# Patient Record
Sex: MALE | Race: WHITE | NOT HISPANIC OR LATINO | Employment: FULL TIME | URBAN - METROPOLITAN AREA
[De-identification: names, ages, dates, MRNs, and addresses within clinical notes are randomized per-mention and may not be internally consistent; named-entity substitution may affect disease eponyms.]

---

## 2019-07-31 ENCOUNTER — HOSPITAL ENCOUNTER (EMERGENCY)
Facility: HOSPITAL | Age: 19
Discharge: HOME | End: 2019-07-31
Attending: EMERGENCY MEDICINE | Admitting: EMERGENCY MEDICINE
Payer: COMMERCIAL

## 2019-07-31 ENCOUNTER — APPOINTMENT (EMERGENCY)
Dept: RADIOLOGY | Facility: HOSPITAL | Age: 19
End: 2019-07-31
Payer: COMMERCIAL

## 2019-07-31 VITALS
DIASTOLIC BLOOD PRESSURE: 81 MMHG | BODY MASS INDEX: 19.26 KG/M2 | HEIGHT: 69 IN | OXYGEN SATURATION: 100 % | WEIGHT: 130 LBS | SYSTOLIC BLOOD PRESSURE: 153 MMHG | TEMPERATURE: 97.7 F | HEART RATE: 81 BPM | RESPIRATION RATE: 18 BRPM

## 2019-07-31 DIAGNOSIS — R11.10 VOMITING: ICD-10-CM

## 2019-07-31 DIAGNOSIS — K92.0 HEMATEMESIS: Primary | ICD-10-CM

## 2019-07-31 LAB
ALBUMIN SERPL BCP-MCNC: 4.1 G/DL (ref 3.5–5)
ALP SERPL-CCNC: 95 U/L (ref 46–484)
ALT SERPL W P-5'-P-CCNC: 8 U/L (ref 12–78)
ANION GAP SERPL CALCULATED.3IONS-SCNC: 6 MMOL/L (ref 4–13)
APTT PPP: 32 SECONDS (ref 23–37)
AST SERPL W P-5'-P-CCNC: 17 U/L (ref 5–45)
BASOPHILS # BLD AUTO: 0.03 THOUSANDS/ΜL (ref 0–0.1)
BASOPHILS NFR BLD AUTO: 1 % (ref 0–1)
BILIRUB SERPL-MCNC: 0.6 MG/DL (ref 0.2–1)
BUN SERPL-MCNC: 12 MG/DL (ref 5–25)
CALCIUM SERPL-MCNC: 8.8 MG/DL (ref 8.3–10.1)
CHLORIDE SERPL-SCNC: 103 MMOL/L (ref 100–108)
CO2 SERPL-SCNC: 32 MMOL/L (ref 21–32)
CREAT SERPL-MCNC: 1.08 MG/DL (ref 0.6–1.3)
EOSINOPHIL # BLD AUTO: 0.34 THOUSAND/ΜL (ref 0–0.61)
EOSINOPHIL NFR BLD AUTO: 5 % (ref 0–6)
ERYTHROCYTE [DISTWIDTH] IN BLOOD BY AUTOMATED COUNT: 12.3 % (ref 11.6–15.1)
GFR SERPL CREATININE-BSD FRML MDRD: 99 ML/MIN/1.73SQ M
GLUCOSE SERPL-MCNC: 103 MG/DL (ref 65–140)
HCT VFR BLD AUTO: 49.7 % (ref 36.5–49.3)
HGB BLD-MCNC: 17.4 G/DL (ref 12–17)
IMM GRANULOCYTES # BLD AUTO: 0.01 THOUSAND/UL (ref 0–0.2)
IMM GRANULOCYTES NFR BLD AUTO: 0 % (ref 0–2)
INR PPP: 1.08 (ref 0.84–1.19)
LIPASE SERPL-CCNC: 152 U/L (ref 73–393)
LYMPHOCYTES # BLD AUTO: 2.38 THOUSANDS/ΜL (ref 0.6–4.47)
LYMPHOCYTES NFR BLD AUTO: 38 % (ref 14–44)
MCH RBC QN AUTO: 33.4 PG (ref 26.8–34.3)
MCHC RBC AUTO-ENTMCNC: 35 G/DL (ref 31.4–37.4)
MCV RBC AUTO: 95 FL (ref 82–98)
MONOCYTES # BLD AUTO: 0.41 THOUSAND/ΜL (ref 0.17–1.22)
MONOCYTES NFR BLD AUTO: 7 % (ref 4–12)
NEUTROPHILS # BLD AUTO: 3.08 THOUSANDS/ΜL (ref 1.85–7.62)
NEUTS SEG NFR BLD AUTO: 49 % (ref 43–75)
NRBC BLD AUTO-RTO: 0 /100 WBCS
PLATELET # BLD AUTO: 289 THOUSANDS/UL (ref 149–390)
PMV BLD AUTO: 9.2 FL (ref 8.9–12.7)
POTASSIUM SERPL-SCNC: 4 MMOL/L (ref 3.5–5.3)
PROT SERPL-MCNC: 7.6 G/DL (ref 6.4–8.2)
PROTHROMBIN TIME: 13.7 SECONDS (ref 11.6–14.5)
RBC # BLD AUTO: 5.21 MILLION/UL (ref 3.88–5.62)
SODIUM SERPL-SCNC: 141 MMOL/L (ref 136–145)
WBC # BLD AUTO: 6.25 THOUSAND/UL (ref 4.31–10.16)

## 2019-07-31 PROCEDURE — 96361 HYDRATE IV INFUSION ADD-ON: CPT

## 2019-07-31 PROCEDURE — 83690 ASSAY OF LIPASE: CPT | Performed by: EMERGENCY MEDICINE

## 2019-07-31 PROCEDURE — 36415 COLL VENOUS BLD VENIPUNCTURE: CPT | Performed by: EMERGENCY MEDICINE

## 2019-07-31 PROCEDURE — 71046 X-RAY EXAM CHEST 2 VIEWS: CPT

## 2019-07-31 PROCEDURE — 85730 THROMBOPLASTIN TIME PARTIAL: CPT | Performed by: EMERGENCY MEDICINE

## 2019-07-31 PROCEDURE — 96375 TX/PRO/DX INJ NEW DRUG ADDON: CPT

## 2019-07-31 PROCEDURE — 80053 COMPREHEN METABOLIC PANEL: CPT | Performed by: EMERGENCY MEDICINE

## 2019-07-31 PROCEDURE — 85610 PROTHROMBIN TIME: CPT | Performed by: EMERGENCY MEDICINE

## 2019-07-31 PROCEDURE — 96365 THER/PROPH/DIAG IV INF INIT: CPT

## 2019-07-31 PROCEDURE — 99284 EMERGENCY DEPT VISIT MOD MDM: CPT | Performed by: EMERGENCY MEDICINE

## 2019-07-31 PROCEDURE — 99285 EMERGENCY DEPT VISIT HI MDM: CPT

## 2019-07-31 PROCEDURE — C9113 INJ PANTOPRAZOLE SODIUM, VIA: HCPCS | Performed by: EMERGENCY MEDICINE

## 2019-07-31 PROCEDURE — 85025 COMPLETE CBC W/AUTO DIFF WBC: CPT | Performed by: EMERGENCY MEDICINE

## 2019-07-31 RX ORDER — ESOMEPRAZOLE MAGNESIUM 40 MG/1
40 CAPSULE, DELAYED RELEASE ORAL DAILY
Qty: 14 CAPSULE | Refills: 0 | Status: SHIPPED | OUTPATIENT
Start: 2019-07-31 | End: 2019-08-14

## 2019-07-31 RX ORDER — ONDANSETRON 2 MG/ML
4 INJECTION INTRAMUSCULAR; INTRAVENOUS ONCE
Status: COMPLETED | OUTPATIENT
Start: 2019-07-31 | End: 2019-07-31

## 2019-07-31 RX ORDER — ONDANSETRON 4 MG/1
4 TABLET, ORALLY DISINTEGRATING ORAL EVERY 8 HOURS PRN
Qty: 12 TABLET | Refills: 0 | Status: SHIPPED | OUTPATIENT
Start: 2019-07-31 | End: 2019-08-04

## 2019-07-31 RX ADMIN — ONDANSETRON 4 MG: 2 INJECTION INTRAMUSCULAR; INTRAVENOUS at 07:10

## 2019-07-31 RX ADMIN — SODIUM CHLORIDE 80 MG: 9 INJECTION, SOLUTION INTRAVENOUS at 07:12

## 2019-07-31 RX ADMIN — SODIUM CHLORIDE 1000 ML: 0.9 INJECTION, SOLUTION INTRAVENOUS at 07:45

## 2019-07-31 NOTE — ED PROVIDER NOTES
History  Chief Complaint   Patient presents with    Vomiting Blood     Pt c/o of vomiting blood this morning, Pt states he vomited 3 times yesterday  Pt states he has upper abdominal pain  22year old male presents for evaluation after vomiting approximately 2 tablespoons of bright red blood 30 minutes prior to arrival  Patient began having moderate epigastric pain this morning as well which is nonradiating  Patient states that he had 3 episodes of emesis yesterday morning  He reports cough for the past week with lateral lower chest wall pain that worsens with movement  Patient reports drinking a single beer last night  No history of hematemesis in the past        History provided by:  Patient  Vomiting   Severity:  Mild  Duration: 30 minutes  Timing:  Sporadic  Number of daily episodes:  1  Quality:  Bright red blood  Progression:  Partially resolved  Chronicity:  New  Recent urination:  Normal  Relieved by:  None tried  Worsened by:  Nothing  Ineffective treatments:  None tried  Associated symptoms: abdominal pain and cough    Associated symptoms: no diarrhea, no fever, no headaches, no myalgias and no sore throat    Abdominal pain:     Location:  Epigastric    Quality: aching and burning      Severity:  Mild    Onset quality:  Sudden    Duration: 30 minutes  Timing:  Constant    Progression:  Partially resolved    Chronicity:  New  Abdominal Pain   Associated symptoms: chest pain (lower bilateral chest wall), cough, nausea and vomiting    Associated symptoms: no constipation, no diarrhea, no dysuria, no fatigue, no fever, no hematuria, no shortness of breath and no sore throat        None       Past Medical History:   Diagnosis Date    Hydronephrosis of left kidney        History reviewed  No pertinent surgical history  History reviewed  No pertinent family history  I have reviewed and agree with the history as documented      Social History     Tobacco Use    Smoking status: Current Every Day Smoker    Smokeless tobacco: Never Used   Substance Use Topics    Alcohol use: Yes    Drug use: Yes     Types: Marijuana        Review of Systems   Constitutional: Negative for appetite change, diaphoresis, fatigue and fever  HENT: Negative for congestion, rhinorrhea and sore throat  Respiratory: Positive for cough  Negative for chest tightness and shortness of breath  Cardiovascular: Positive for chest pain (lower bilateral chest wall)  Negative for palpitations and leg swelling  Gastrointestinal: Positive for abdominal pain, nausea and vomiting  Negative for constipation and diarrhea  Genitourinary: Negative for difficulty urinating, dysuria, frequency and hematuria  Musculoskeletal: Negative for myalgias, neck pain and neck stiffness  Skin: Negative for pallor  Neurological: Negative for syncope, weakness and headaches  All other systems reviewed and are negative  Physical Exam  Physical Exam   Constitutional: He appears well-developed and well-nourished  Non-toxic appearance  No distress  HENT:   Head: Normocephalic and atraumatic  Eyes: Pupils are equal, round, and reactive to light  EOM are normal    Neck: Normal range of motion  No tracheal deviation present  No thyromegaly present  Cardiovascular: Normal rate, regular rhythm, normal heart sounds and intact distal pulses  Pulmonary/Chest: Effort normal and breath sounds normal    Abdominal: Soft  Bowel sounds are normal  He exhibits no distension  There is no tenderness  Lymphadenopathy:     He has no cervical adenopathy  Skin: Skin is warm and dry  He is not diaphoretic  Nursing note and vitals reviewed        Vital Signs  ED Triage Vitals [07/31/19 0638]   Temperature Pulse Respirations Blood Pressure SpO2   97 7 °F (36 5 °C) 81 18 153/81 100 %      Temp Source Heart Rate Source Patient Position - Orthostatic VS BP Location FiO2 (%)   Tympanic Monitor Lying Right arm --      Pain Score       5           Vitals: 07/31/19 0638   BP: 153/81   Pulse: 81   Patient Position - Orthostatic VS: Lying         Visual Acuity  Visual Acuity      Most Recent Value   L Pupil Size (mm)  4   R Pupil Size (mm)  4          ED Medications  Medications   pantoprazole (PROTONIX) 80 mg in sodium chloride 0 9 % 100 mL IVPB (0 mg Intravenous Stopped 7/31/19 0731)   ondansetron (ZOFRAN) injection 4 mg (4 mg Intravenous Given 7/31/19 0710)   sodium chloride 0 9 % bolus 1,000 mL (0 mL Intravenous Stopped 7/31/19 0831)       Diagnostic Studies  Results Reviewed     Procedure Component Value Units Date/Time    Comprehensive metabolic panel [[id number]]  (Abnormal) Collected:  07/31/19 0647    Lab Status:  Final result Specimen:  Blood from Line, Venous Updated:  07/31/19 0749     Sodium 141 mmol/L      Potassium 4 0 mmol/L      Chloride 103 mmol/L      CO2 32 mmol/L      ANION GAP 6 mmol/L      BUN 12 mg/dL      Creatinine 1 08 mg/dL      Glucose 103 mg/dL      Calcium 8 8 mg/dL      AST 17 U/L      ALT 8 U/L      Alkaline Phosphatase 95 U/L      Total Protein 7 6 g/dL      Albumin 4 1 g/dL      Total Bilirubin 0 60 mg/dL      eGFR 99 ml/min/1 73sq m     Narrative:       [place] guidelines for Chronic Kidney Disease (CKD):     Stage 1 with normal or high GFR (GFR > 90 mL/min/1 73 square meters)    Stage 2 Mild CKD (GFR = 60-89 mL/min/1 73 square meters)    Stage 3A Moderate CKD (GFR = 45-59 mL/min/1 73 square meters)    Stage 3B Moderate CKD (GFR = 30-44 mL/min/1 73 square meters)    Stage 4 Severe CKD (GFR = 15-29 mL/min/1 73 square meters)    Stage 5 End Stage CKD (GFR <15 mL/min/1 73 square meters)  Note: GFR calculation is accurate only with a steady state creatinine    Lipase [[id number]]  (Normal) Collected:  07/31/19 0647    Lab Status:  Final result Specimen:  Blood from Line, Venous Updated:  07/31/19 0749     Lipase 152 u/L     Protime-INR [[id number]]  (Normal) Collected:  07/31/19 0647    Lab Status:  Final result Specimen:  Blood from Line, Venous Updated:  07/31/19 0745     Protime 13 7 seconds      INR 1 08    APTT [[id number]]  (Normal) Collected:  07/31/19 0647    Lab Status:  Final result Specimen:  Blood from Line, Venous Updated:  07/31/19 0745     PTT 32 seconds     CBC and differential [[id number]]  (Abnormal) Collected:  07/31/19 0647    Lab Status:  Final result Specimen:  Blood from Line, Venous Updated:  07/31/19 0731     WBC 6 25 Thousand/uL      RBC 5 21 Million/uL      Hemoglobin 17 4 g/dL      Hematocrit 49 7 %      MCV 95 fL      MCH 33 4 pg      MCHC 35 0 g/dL      RDW 12 3 %      MPV 9 2 fL      Platelets 644 Thousands/uL      nRBC 0 /100 WBCs      Neutrophils Relative 49 %      Immat GRANS % 0 %      Lymphocytes Relative 38 %      Monocytes Relative 7 %      Eosinophils Relative 5 %      Basophils Relative 1 %      Neutrophils Absolute 3 08 Thousands/µL      Immature Grans Absolute 0 01 Thousand/uL      Lymphocytes Absolute 2 38 Thousands/µL      Monocytes Absolute 0 41 Thousand/µL      Eosinophils Absolute 0 34 Thousand/µL      Basophils Absolute 0 03 Thousands/µL                  XR chest 2 views   Final Result by Paul Clement MD (07/31 0750)      No acute cardiopulmonary disease  Workstation performed: [id number]                    Procedures  Procedures       ED Course                               MDM  Number of Diagnoses or Management Options  Hematemesis: new and requires workup  Vomiting: new and requires workup  Diagnosis management comments: 22year old male presents for evaluation after episode of hematemesis  Associated epigastric pain which partially resolved prior to arrival  Protonix for possible bleeding from gastric ulcer  Zofran for nausea  Labs and CXR pending  Patient signed out to Dr Sharon Royal          Amount and/or Complexity of Data Reviewed  Clinical lab tests: ordered  Tests in the radiology section of CPT®: ordered    Patient Progress  Patient progress: stable      Disposition  Final diagnoses:   Hematemesis   Vomiting     Time reflects when diagnosis was documented in both MDM as applicable and the Disposition within this note     Time User Action Codes Description Comment    7/31/2019  8:26 AM Charito Vogt [K92 0] Hematemesis     7/31/2019  8:26 AM Charito Honeycutt [R11 10] Vomiting       ED Disposition     ED Disposition Condition Date/Time Comment    Discharge Stable Wed Jul 31, 2019  8:26 AM Angle Thakkar discharge to home/self care  Follow-up Information     Follow up With Specialties Details Why Contact Info Additional Information    [facility] Emergency Department Emergency Medicine  As needed, If symptoms worsen [address] [facility]  [address] [facility] [address] [place] [address] [place] [facility], [facility], [facility], [address] [facility], [address] [place] [address] [facility]ology Schedule an appointment as soon as possible for a visit in 3 days Re-evaluation [phone] [phone]  [facility], [address] [place], [place], [address]          Discharge Medication List as of 7/31/2019  8:28 AM      START taking these medications    Details   esomeprazole (NexIUM) 40 MG capsule Take 1 capsule (40 mg total) by mouth daily for 14 days, Starting Wed 7/31/2019, Until Wed 8/14/2019, Print      ondansetron (ZOFRAN-ODT) 4 mg disintegrating tablet Take 1 tablet (4 mg total) by mouth every 8 (eight) hours as needed for nausea or vomiting for up to 4 days, Starting Wed 7/31/2019, Until Sun 8/4/2019, Print           No discharge procedures on file      ED Provider  Electronically Signed by           Phi West MD  08/01/19 6229

## 2019-07-31 NOTE — DISCHARGE INSTRUCTIONS
Please follow-up with Gastroenterology    Return to ER if feeling worse, vomiting blood, lightheaded or feel worse overall

## 2019-07-31 NOTE — ED CARE HANDOFF
Emergency Department Sign Out Note        Sign out and transfer of care from Dr Snell Given  See Separate Emergency Department note  The patient, Imelda Montero, was evaluated by the previous provider for vomiting blood  Workup Completed:  Waiting on blood work    ED Course / Workup Pending (followup):  Pt asymptomatic in ER  Case discussed with gastroenterologist, Dr Yessenia Bailey  Patient okay to follow up outpatient with 2 weeks of antacid medication  ED Course as of Jul 31 0926 Wed Jul 31, 2019   0819 Patient states they have Nexium at home  Prefer to take that over Protonix      0825 Patient tolerated p o  Challenge  Will discharge  Procedures  MDM    Disposition  Final diagnoses:   Hematemesis   Vomiting     Time reflects when diagnosis was documented in both MDM as applicable and the Disposition within this note     Time User Action Codes Description Comment    7/31/2019  8:26 AM Charito Vogt Add [K92 0] Hematemesis     7/31/2019  8:26 AM Charito Goodwin Add [R11 10] Vomiting       ED Disposition     ED Disposition Condition Date/Time Comment    Discharge Stable Wed Jul 31, 2019  8:26 AM Imelda Montero discharge to home/self care              Follow-up Information     Follow up With Specialties Details Why Contact Info Additional Information    [address] [facility] Emergency Department Emergency Medicine  As needed, If symptoms worsen [address] [facility]  [address] [place] [address] [address] [place] [address] [place] [facility], [address], [address] [facility] G[facility]ology Schedule an appointment as soon as possible for a visit in 3 days Re-evaluation [address] [phone]  [facility], [address] [place], [place], [address]        Discharge Medication List as of 7/31/2019  8:28 AM      START taking these medications Details   esomeprazole (NexIUM) 40 MG capsule Take 1 capsule (40 mg total) by mouth daily for 14 days, Starting Wed 7/31/2019, Until Wed 8/14/2019, Print      ondansetron (ZOFRAN-ODT) 4 mg disintegrating tablet Take 1 tablet (4 mg total) by mouth every 8 (eight) hours as needed for nausea or vomiting for up to 4 days, Starting Wed 7/31/2019, Until Sun 8/4/2019, Print           No discharge procedures on file         ED Provider  Electronically Signed by     Chelita Dunham MD  07/31/19 8456